# Patient Record
Sex: FEMALE | Race: WHITE | NOT HISPANIC OR LATINO | Employment: FULL TIME | URBAN - NONMETROPOLITAN AREA
[De-identification: names, ages, dates, MRNs, and addresses within clinical notes are randomized per-mention and may not be internally consistent; named-entity substitution may affect disease eponyms.]

---

## 2022-12-22 ENCOUNTER — OFFICE VISIT (OUTPATIENT)
Dept: CARDIOLOGY | Facility: CLINIC | Age: 57
End: 2022-12-22

## 2022-12-22 VITALS
HEIGHT: 66 IN | OXYGEN SATURATION: 96 % | WEIGHT: 204 LBS | HEART RATE: 67 BPM | BODY MASS INDEX: 32.78 KG/M2 | RESPIRATION RATE: 18 BRPM | SYSTOLIC BLOOD PRESSURE: 130 MMHG | DIASTOLIC BLOOD PRESSURE: 78 MMHG | TEMPERATURE: 97.3 F

## 2022-12-22 DIAGNOSIS — I35.8 HEART MURMUR, AORTIC: Primary | ICD-10-CM

## 2022-12-22 DIAGNOSIS — E78.5 HYPERLIPIDEMIA LDL GOAL <70: ICD-10-CM

## 2022-12-22 DIAGNOSIS — I10 ESSENTIAL HYPERTENSION: ICD-10-CM

## 2022-12-22 PROBLEM — E11.9 TYPE 2 DIABETES MELLITUS WITHOUT COMPLICATION, WITHOUT LONG-TERM CURRENT USE OF INSULIN: Status: ACTIVE | Noted: 2022-12-22

## 2022-12-22 PROCEDURE — 93000 ELECTROCARDIOGRAM COMPLETE: CPT | Performed by: INTERNAL MEDICINE

## 2022-12-22 PROCEDURE — 99204 OFFICE O/P NEW MOD 45 MIN: CPT | Performed by: INTERNAL MEDICINE

## 2022-12-22 RX ORDER — AMINO ACIDS/CHROMIUM
1 TABLET ORAL DAILY
COMMUNITY

## 2022-12-22 RX ORDER — ALPRAZOLAM 0.5 MG/1
0.5 TABLET ORAL 2 TIMES DAILY PRN
COMMUNITY

## 2022-12-22 RX ORDER — PAROXETINE 30 MG/1
30 TABLET, FILM COATED ORAL EVERY MORNING
COMMUNITY

## 2022-12-22 RX ORDER — METFORMIN HYDROCHLORIDE 500 MG/1
500 TABLET, EXTENDED RELEASE ORAL
COMMUNITY
End: 2023-01-19 | Stop reason: ALTCHOICE

## 2022-12-22 RX ORDER — ATENOLOL 50 MG/1
50 TABLET ORAL DAILY
COMMUNITY

## 2022-12-22 RX ORDER — VALSARTAN 160 MG/1
160 TABLET ORAL DAILY
Qty: 30 TABLET | Refills: 11 | Status: SHIPPED | OUTPATIENT
Start: 2022-12-22

## 2022-12-22 RX ORDER — FENOFIBRATE 145 MG/1
145 TABLET, COATED ORAL DAILY
COMMUNITY

## 2022-12-22 RX ORDER — ROSUVASTATIN CALCIUM 20 MG/1
20 TABLET, COATED ORAL DAILY
Qty: 90 TABLET | Refills: 3 | Status: SHIPPED | OUTPATIENT
Start: 2022-12-22

## 2022-12-22 RX ORDER — HYDROCHLOROTHIAZIDE 12.5 MG/1
12.5 CAPSULE, GELATIN COATED ORAL DAILY
COMMUNITY
End: 2022-12-22 | Stop reason: DRUGHIGH

## 2022-12-22 NOTE — PROGRESS NOTES
[facility] [place]  [facility] CARDIOLOGY  [address] [place] [address]  Dept: [phone]  Dept Fax: [phone]    Mandy Owens  1965    New Patient Office Note    History of Present Illness:  Mandy Owens is a 57 y.o. female who presents to the clinic for Establish Care. For heart murmur- She is 57 years old with diabetes for 1 year, hypertension, hyperlipidemia on Tricor, has seen a cardiologist for heart murmur and possible bicuspid valve, who left the practice and she is here to establish care, she denies any complaints, chest pain, SOB, edema, she takes Atenolol 50 mg and HCTZ 12,5 plus tricor, , her cardiac exam is consistent with a CARO on the aortic valve area, , BP is 140.80, no family history for congenital heart disease,, will get an echo, will also d.c HCTZ and use Valsartan 160 mg,  And will add Crestor 20 mg to Tricor,  , will see her back in 1 month with an echo    The following portions of the patient's history were reviewed and updated as appropriate: allergies, current medications, past family history, past medical history, past social history, past surgical history and problem list.    Medications:  ALPRAZolam  atenolol  Chromium tablet  empagliflozin tablet  fenofibrate  metFORMIN ER  PARoxetine  rosuvastatin  valsartan    Subjective  No Known Allergies     History reviewed. No pertinent past medical history.    History reviewed. No pertinent surgical history.    History reviewed. No pertinent family history.     Social History     Socioeconomic History   • Marital status:        Review of Systems   Constitutional: Negative.    HENT: Negative.    Respiratory: Negative.    Cardiovascular: Negative.    Endocrine: Negative.    Genitourinary: Negative.    Musculoskeletal: Negative.    Skin: Negative.    Allergic/Immunologic: Negative.    Neurological: Negative.    Hematological: Negative.    Psychiatric/Behavioral: Negative.        Cardiovascular  "Procedures    ECHO/MUGA:   STRESS TESTS:   CARDIAC CATH:   DEVICES:   HOLTER:   CT/MRI:   VASCULAR:   CARDIOTHORACIC:     Objective  Vitals:    12/22/22 1255   BP: 130/78   BP Location: Right arm   Patient Position: Lying   Cuff Size: Large Adult   Pulse: 67   Resp: 18   Temp: 97.3 °F (36.3 °C)   TempSrc: Infrared   SpO2: 96%   Weight: 92.5 kg (204 lb)   Height: 167.6 cm (66\")   PainSc: 0-No pain       Physical Exam  Vitals reviewed.   Constitutional:       Appearance: Healthy appearance. Not in distress.   Neck:      Vascular: No JVR. JVD normal.   Pulmonary:      Effort: Pulmonary effort is normal.      Breath sounds: Normal breath sounds. No wheezing. No rhonchi. No rales.   Chest:      Chest wall: Not tender to palpatation.   Cardiovascular:      PMI at left midclavicular line. Normal rate. Regular rhythm. Normal S1. Normal S2.      Murmurs: There is a grade 3/6 harsh midsystolic murmur at the URSB, radiating to the neck.      No gallop. No click. No rub.   Pulses:     Intact distal pulses.   Edema:     Peripheral edema absent.   Abdominal:      General: Bowel sounds are normal.      Palpations: Abdomen is soft.      Tenderness: There is no abdominal tenderness.   Musculoskeletal: Normal range of motion.         General: No tenderness. Skin:     General: Skin is warm and dry.   Neurological:      General: No focal deficit present.      Mental Status: Alert and oriented to person, place and time.          Diagnostic Data    ECG 12 Lead    Date/Time: 12/22/2022 2:14 PM  Performed by: Alan Henry MD  Authorized by: Alan Henry MD   Comparison: not compared with previous ECG   Previous ECG: no previous ECG available  Rhythm: sinus rhythm  Rate: normal  BPM: 70  Conduction: conduction normal    Clinical impression: normal ECG            Assessment and Plan  Diagnoses and all orders for this visit:    Heart murmur, aortic- Seems more for AS, as she has history of bicuspid valve will get an " echo    -     Adult Transthoracic Echo Complete W/ Cont if Necessary Per Protocol; Future    Essential hypertension- Will use Valsartan 160 mg instead HCTZ 12,5, keep Atenolol 50 mg   -     Adult Transthoracic Echo Complete W/ Cont if Necessary Per Protocol; Future    Hyperlipidemia LDL goal <70- Will start Crestor 20 mg, keep tricor for now  -     Adult Transthoracic Echo Complete W/ Cont if Necessary Per Protocol; Future    Other orders  -     Discontinue: hydroCHLOROthiazide (MICROZIDE) 12.5 MG capsule; Take 12.5 mg by mouth Daily.  -     fenofibrate (TRICOR) 145 MG tablet; Take 145 mg by mouth Daily.  -     atenolol (TENORMIN) 50 MG tablet; Take 50 mg by mouth Daily.  -     PARoxetine (PAXIL) 30 MG tablet; Take 30 mg by mouth Every Morning.  -     ALPRAZolam (XANAX) 0.5 MG tablet; Take 0.5 mg by mouth 2 (Two) Times a Day As Needed for Anxiety.  -     empagliflozin (Jardiance) 25 MG tablet tablet; Take 25 mg by mouth Daily.  -     metFORMIN ER (GLUCOPHAGE-XR) 500 MG 24 hr tablet; Take 500 mg by mouth Daily With Breakfast.  -     Chromium 1000 MCG tablet; Take 1 mg by mouth Daily.  -     valsartan (DIOVAN) 160 MG tablet; Take 1 tablet by mouth Daily.  -     rosuvastatin (CRESTOR) 20 MG tablet; Take 1 tablet by mouth Daily.        Return in about 4 weeks (around 1/19/2023) for Recheck.    Alan Henry MD  12/22/2022

## 2022-12-27 ENCOUNTER — TELEPHONE (OUTPATIENT)
Dept: CARDIOLOGY | Facility: CLINIC | Age: 57
End: 2022-12-27

## 2023-01-19 ENCOUNTER — OFFICE VISIT (OUTPATIENT)
Dept: CARDIOLOGY | Facility: CLINIC | Age: 58
End: 2023-01-19
Payer: COMMERCIAL

## 2023-01-19 VITALS
TEMPERATURE: 98 F | HEIGHT: 66 IN | WEIGHT: 203 LBS | RESPIRATION RATE: 12 BRPM | OXYGEN SATURATION: 99 % | DIASTOLIC BLOOD PRESSURE: 82 MMHG | BODY MASS INDEX: 32.62 KG/M2 | HEART RATE: 66 BPM | SYSTOLIC BLOOD PRESSURE: 136 MMHG

## 2023-01-19 DIAGNOSIS — E11.9 TYPE 2 DIABETES MELLITUS WITHOUT COMPLICATION, WITHOUT LONG-TERM CURRENT USE OF INSULIN: ICD-10-CM

## 2023-01-19 DIAGNOSIS — E78.5 HYPERLIPIDEMIA LDL GOAL <70: ICD-10-CM

## 2023-01-19 DIAGNOSIS — I10 ESSENTIAL HYPERTENSION: ICD-10-CM

## 2023-01-19 DIAGNOSIS — I35.0 AORTIC STENOSIS, MILD: Primary | ICD-10-CM

## 2023-01-19 PROBLEM — I35.8 HEART MURMUR, AORTIC: Status: RESOLVED | Noted: 2022-12-22 | Resolved: 2023-01-19

## 2023-01-19 PROCEDURE — 99214 OFFICE O/P EST MOD 30 MIN: CPT | Performed by: INTERNAL MEDICINE

## 2023-01-19 NOTE — PROGRESS NOTES
[facility] [place]  [facility] CARDIOLOGY  [address] [place] [address]  Dept: [phone]  Dept Fax: [phone]    Mandy Owens  1965    Follow Up Office Visit Note    History of Present Illness:  Mandy Owens is a 57 y.o. female who presents to the clinic for Follow-up and Heart Murmur. She underwent echo and this showed possible bicuspid valve with mild AS TARYN 1,4 cm, Mean gradient 13 and aortic velocity 2.,4 cm, she was advised her children and sibling needs to get check, the valve could not be seen well but because she has aortic stenosis at young age likely is bicuspid, will see her annually, she was explained possible complaints when the valve gets tighter and severe CP, SOB, syncope,     The following portions of the patient's history were reviewed and updated as appropriate: allergies, current medications, past family history, past medical history, past social history, past surgical history, and problem list.    Medications:  ALPRAZolam  atenolol  Chromium tablet  empagliflozin tablet  fenofibrate  PARoxetine  rosuvastatin  valsartan    Subjective  No Known Allergies     History reviewed. No pertinent past medical history.    History reviewed. No pertinent surgical history.    History reviewed. No pertinent family history.     Social History     Socioeconomic History   • Marital status:        Review of Systems   Constitutional: Negative.    HENT: Negative.    Respiratory: Negative.    Cardiovascular: Negative.    Endocrine: Negative.    Genitourinary: Negative.    Musculoskeletal: Negative.    Skin: Negative.    Allergic/Immunologic: Negative.    Neurological: Negative.    Hematological: Negative.    Psychiatric/Behavioral: Negative.        Cardiovascular Procedures    ECHO/MUGA:  STRESS TESTS:   CARDIAC CATH:   DEVICES:   HOLTER:   CT/MRI:   VASCULAR:   CARDIOTHORACIC:     Objective  Vitals:    01/19/23 0938   BP: 136/82   BP Location: Right arm   Patient  "Position: Lying   Cuff Size: Adult   Pulse: 66   Resp: 12   Temp: 98 °F (36.7 °C)   TempSrc: Infrared   SpO2: 99%   Weight: 92.1 kg (203 lb)   Height: 167.6 cm (66\")   PainSc: 0-No pain     Body mass index is 32.77 kg/m².     Physical Exam  Constitutional:       Appearance: Healthy appearance. Not in distress.   Neck:      Vascular: No JVR. JVD normal.   Pulmonary:      Effort: Pulmonary effort is normal.      Breath sounds: Normal breath sounds. No wheezing. No rhonchi. No rales.   Chest:      Chest wall: Not tender to palpatation.   Cardiovascular:      PMI at left midclavicular line. Normal rate. Regular rhythm. Normal S1. Normal S2.      Murmurs: There is a grade 3/6 harsh midsystolic murmur at the URSB, radiating to the neck.      No gallop. No click. No rub.   Pulses:     Intact distal pulses.   Edema:     Peripheral edema absent.   Abdominal:      General: Bowel sounds are normal.      Palpations: Abdomen is soft.      Tenderness: There is no abdominal tenderness.   Musculoskeletal: Normal range of motion.         General: No tenderness. Skin:     General: Skin is warm and dry.   Neurological:      General: No focal deficit present.      Mental Status: Alert and oriented to person, place and time.          Diagnostic Data  Procedures    Assessment and Plan  Diagnoses and all orders for this visit:    Aortic stenosis, mild- TARYN 1,4 cm, aortic velocity 2,4 and Mean gradient 13.    Essential hypertension- BP is 130.80 on Valsartan 160 mg and also Atenolol 50 mg, will keep both    Hyperlipidemia LDL goal <70- We start her on Crestor 20 mg, she is also on Fenofibrate, will need a Lipid in 3-4 months we might juanac the Fenofibrate depends of the lab    Type 2 diabetes mellitus without complication, without long-term current use of insulin (HCC)         Return in about 1 year (around 1/19/2024) for Recheck with Dr. Henry.    Alan Henry MD  01/19/2023  "

## 2024-01-19 RX ORDER — VALSARTAN 160 MG/1
160 TABLET ORAL DAILY
Qty: 30 TABLET | Refills: 0 | Status: SHIPPED | OUTPATIENT
Start: 2024-01-19

## 2024-01-19 RX ORDER — ROSUVASTATIN CALCIUM 20 MG/1
20 TABLET, COATED ORAL DAILY
Qty: 30 TABLET | Refills: 0 | Status: SHIPPED | OUTPATIENT
Start: 2024-01-19

## 2024-03-07 ENCOUNTER — OFFICE VISIT (OUTPATIENT)
Dept: CARDIOLOGY | Facility: CLINIC | Age: 59
End: 2024-03-07
Payer: COMMERCIAL

## 2024-03-07 ENCOUNTER — TELEPHONE (OUTPATIENT)
Dept: CARDIOLOGY | Facility: CLINIC | Age: 59
End: 2024-03-07

## 2024-03-07 VITALS
HEART RATE: 66 BPM | HEIGHT: 66 IN | DIASTOLIC BLOOD PRESSURE: 72 MMHG | RESPIRATION RATE: 16 BRPM | BODY MASS INDEX: 33.11 KG/M2 | OXYGEN SATURATION: 97 % | WEIGHT: 206 LBS | SYSTOLIC BLOOD PRESSURE: 104 MMHG

## 2024-03-07 DIAGNOSIS — I35.0 AORTIC STENOSIS, MILD: ICD-10-CM

## 2024-03-07 DIAGNOSIS — E78.5 HYPERLIPIDEMIA LDL GOAL <70: ICD-10-CM

## 2024-03-07 DIAGNOSIS — I10 ESSENTIAL HYPERTENSION: Primary | ICD-10-CM

## 2024-03-07 DIAGNOSIS — E11.9 TYPE 2 DIABETES MELLITUS WITHOUT COMPLICATION, WITHOUT LONG-TERM CURRENT USE OF INSULIN: ICD-10-CM

## 2024-03-07 RX ORDER — ROSUVASTATIN CALCIUM 20 MG/1
20 TABLET, COATED ORAL DAILY
Qty: 90 TABLET | Refills: 3 | Status: SHIPPED | OUTPATIENT
Start: 2024-03-07

## 2024-03-07 RX ORDER — VALSARTAN 320 MG/1
320 TABLET ORAL DAILY
Qty: 90 TABLET | Refills: 3 | Status: SHIPPED | OUTPATIENT
Start: 2024-03-07 | End: 2024-03-07

## 2024-03-07 RX ORDER — HYDROCHLOROTHIAZIDE 12.5 MG/1
12.5 TABLET ORAL DAILY
COMMUNITY
Start: 2024-02-02 | End: 2024-03-07 | Stop reason: ALTCHOICE

## 2024-03-07 RX ORDER — ROSUVASTATIN CALCIUM 20 MG/1
20 TABLET, COATED ORAL DAILY
Qty: 90 TABLET | Refills: 3 | Status: SHIPPED | OUTPATIENT
Start: 2024-03-07 | End: 2024-03-07

## 2024-03-07 RX ORDER — VALSARTAN 320 MG/1
320 TABLET ORAL DAILY
Qty: 90 TABLET | Refills: 3 | Status: SHIPPED | OUTPATIENT
Start: 2024-03-07

## 2024-03-07 RX ORDER — ROSUVASTATIN CALCIUM 20 MG/1
20 TABLET, COATED ORAL DAILY
Qty: 30 TABLET | Refills: 0 | Status: SHIPPED | OUTPATIENT
Start: 2024-03-07 | End: 2024-03-07 | Stop reason: SDUPTHER

## 2024-03-07 RX ORDER — VALSARTAN 320 MG/1
320 TABLET ORAL DAILY
Qty: 30 TABLET | Refills: 1 | Status: SHIPPED | OUTPATIENT
Start: 2024-03-07 | End: 2024-03-07 | Stop reason: SDUPTHER

## 2024-03-08 NOTE — PROGRESS NOTES
[facility] [place]  [facility] CARDIOLOGY  [address] [place] [address]  Dept: [phone]  Dept Fax: [phone]    Mandy Owens  1965    Follow Up Office Visit Note    History of Present Illness:  Mandy Owens is a 58 y.o. female who presents to the clinic for Follow-up.Bicuspid Aortic valve with Mild progressive aortic stenosis 1,3 cm    denies any complaints, no CP, no SOB, no syncope    The following portions of the patient's history were reviewed and updated as appropriate: allergies, current medications, past family history, past medical history, past social history, past surgical history, and problem list.    Medications:  ALPRAZolam  atenolol  Chromium tablet  empagliflozin tablet  PARoxetine  rosuvastatin  valsartan    Subjective  No Known Allergies     History reviewed. No pertinent past medical history.    History reviewed. No pertinent surgical history.    History reviewed. No pertinent family history.     Social History     Socioeconomic History    Marital status:    Tobacco Use    Smoking status: Former     Current packs/day: 0.00     Types: Cigarettes     Quit date:      Years since quittin.1    Smokeless tobacco: Never   Vaping Use    Vaping status: Every Day    Substances: Nicotine   Substance and Sexual Activity    Alcohol use: Not Currently    Drug use: Never    Sexual activity: Defer       Review of Systems   Constitutional: Negative.    HENT: Negative.     Respiratory: Negative.     Cardiovascular: Negative.    Endocrine: Negative.    Genitourinary: Negative.    Musculoskeletal: Negative.    Skin: Negative.    Allergic/Immunologic: Negative.    Neurological: Negative.    Hematological: Negative.    Psychiatric/Behavioral: Negative.       Cardiovascular Procedures    ECHO/MUGA:  STRESS TESTS:   CARDIAC CATH:   DEVICES:   HOLTER:   CT/MRI:   VASCULAR:   CARDIOTHORACIC:     Objective  Vitals:    24 0920   BP: 104/72   BP Location: Right  "arm   Patient Position: Lying   Cuff Size: Large Adult   Pulse: 66   Resp: 16   SpO2: 97%   Weight: 93.4 kg (206 lb)   Height: 167.6 cm (66\")   PainSc: 0-No pain     Body mass index is 33.25 kg/m².     Physical Exam  Vitals reviewed.   Constitutional:       Appearance: Healthy appearance. Not in distress.   Neck:      Vascular: No JVR. JVD normal.   Pulmonary:      Effort: Pulmonary effort is normal.      Breath sounds: Normal breath sounds. No wheezing. No rhonchi. No rales.   Chest:      Chest wall: Not tender to palpatation.   Cardiovascular:      PMI at left midclavicular line. Normal rate. Regular rhythm. Normal S1. Normal S2.       Murmurs: There is a harsh midsystolic murmur at the URSB, radiating to the neck.      No gallop.  No click. No rub.   Pulses:     Intact distal pulses.   Edema:     Peripheral edema absent.   Abdominal:      General: Bowel sounds are normal.      Palpations: Abdomen is soft.      Tenderness: There is no abdominal tenderness.   Musculoskeletal: Normal range of motion.         General: No tenderness. Skin:     General: Skin is warm and dry.   Neurological:      General: No focal deficit present.      Mental Status: Alert and oriented to person, place and time.        Diagnostic Data    ECG 12 Lead    Date/Time: 3/7/2024 8:13 PM  Performed by: Alan Henry MD    Authorized by: Alan Henry MD  Comparison: compared with previous ECG from 12/22/2022  Similar to previous ECG  Rhythm: sinus rhythm  Rate: normal  BPM: 61  QRS axis: normal    Clinical impression: normal ECG        Assessment and Plan  Diagnoses and all orders for this visit:    Essential hypertension-BP is 110.80 on Valsartan 160 mg, will increase to 320 mg, will d/c HCTZ and keep Atenolol 50 mg, we might lower the dose later    Aortic stenosis, mild- 1,3 cm 2023, no complaints, Aortic velocity 24 , will get an echo next year  -     Adult Transthoracic Echo Complete W/ Cont if Necessary Per Protocol; " Future    Hyperlipidemia LDL goal <70- On crestor 20 mg, Last LDL 40, tolerates well    Type 2 diabetes mellitus without complication, without long-term current use of insulin    Other orders  -     Discontinue: hydroCHLOROthiazide 12.5 MG tablet; Take 1 tablet by mouth Daily.  -     Discontinue: valsartan (DIOVAN) 320 MG tablet; Take 1 tablet by mouth Daily.  -     Discontinue: valsartan (DIOVAN) 320 MG tablet; Take 1 tablet by mouth Daily.  -     Discontinue: rosuvastatin (CRESTOR) 20 MG tablet; Take 1 tablet by mouth Daily.  -     Discontinue: rosuvastatin (CRESTOR) 20 MG tablet; Take 1 tablet by mouth Daily.         Return in about 1 year (around 3/7/2025) for Recheck with Dr. Henry.    Alan Henry MD  03/07/2024

## 2025-03-03 ENCOUNTER — TELEPHONE (OUTPATIENT)
Dept: CARDIOLOGY | Facility: CLINIC | Age: 60
End: 2025-03-03
Payer: COMMERCIAL

## 2025-03-03 NOTE — TELEPHONE ENCOUNTER
Left message for patient to call back and schedule annual ECHO, ordered by Dr Henry, before her appointment with Dr. Henry 3/14/25. HUB to transfer call to the office.

## 2025-03-04 NOTE — TELEPHONE ENCOUNTER
Caller: Mandy Owens    Relationship to patient: Self    Best call back number: [phone]    Patient is needing: PATIENT WT TO OFFICE

## 2025-03-18 ENCOUNTER — TELEPHONE (OUTPATIENT)
Dept: CARDIOLOGY | Facility: CLINIC | Age: 60
End: 2025-03-18
Payer: COMMERCIAL

## 2025-03-18 RX ORDER — VALSARTAN 320 MG/1
320 TABLET ORAL DAILY
Qty: 30 TABLET | Refills: 0 | Status: SHIPPED | OUTPATIENT
Start: 2025-03-18 | End: 2025-03-19 | Stop reason: SDUPTHER

## 2025-03-18 RX ORDER — ROSUVASTATIN CALCIUM 20 MG/1
20 TABLET, COATED ORAL DAILY
Qty: 30 TABLET | Refills: 0 | Status: SHIPPED | OUTPATIENT
Start: 2025-03-18 | End: 2025-03-19 | Stop reason: SDUPTHER

## 2025-03-18 NOTE — TELEPHONE ENCOUNTER
Pt needs apt has been over 1 year . Will send in 30 days only . Called and left pt a message and hub can make her an appt

## 2025-03-18 NOTE — TELEPHONE ENCOUNTER
Pt has new pharmacy benefits and [facility]. Currently set up for an Echo 4/3.

## 2025-03-19 RX ORDER — ROSUVASTATIN CALCIUM 20 MG/1
20 TABLET, COATED ORAL DAILY
Qty: 30 TABLET | Refills: 0 | Status: SHIPPED | OUTPATIENT
Start: 2025-03-19

## 2025-03-19 RX ORDER — VALSARTAN 320 MG/1
320 TABLET ORAL DAILY
Qty: 30 TABLET | Refills: 0 | Status: SHIPPED | OUTPATIENT
Start: 2025-03-19 | End: 2025-03-19 | Stop reason: SDUPTHER

## 2025-03-19 RX ORDER — ROSUVASTATIN CALCIUM 20 MG/1
20 TABLET, COATED ORAL DAILY
Qty: 30 TABLET | Refills: 0 | Status: SHIPPED | OUTPATIENT
Start: 2025-03-19 | End: 2025-03-19 | Stop reason: SDUPTHER

## 2025-03-19 RX ORDER — VALSARTAN 320 MG/1
320 TABLET ORAL DAILY
Qty: 30 TABLET | Refills: 0 | Status: SHIPPED | OUTPATIENT
Start: 2025-03-19

## 2025-03-19 NOTE — TELEPHONE ENCOUNTER
The pt is going to be scheduled for an appointment. It had to be postponed due to tech being out sick last week and she needs the Echo done before she sees Dr Henry.    Also, the medications were sent to the wrong pharmacy. Per my previous message, her meds need to be sent to [facility]; not [facility].

## 2025-04-30 ENCOUNTER — TELEPHONE (OUTPATIENT)
Dept: CARDIOLOGY | Facility: CLINIC | Age: 60
End: 2025-04-30

## 2025-04-30 ENCOUNTER — TELEPHONE (OUTPATIENT)
Dept: CARDIOLOGY | Facility: CLINIC | Age: 60
End: 2025-04-30
Payer: COMMERCIAL

## 2025-04-30 NOTE — TELEPHONE ENCOUNTER
Caller: Mandy Owens    Relationship: Self    Best call back number: [phone]     Who is your current provider: MD SUZIE     Is your current provider offboarding? NO     Who would you like your new provider to be: MD MICHAEL     What are your reasons for transferring care: MOTHER AND AUNT SEES DR. RODRIGUEZ, KNOWS THEIR FAMILY HX.

## 2025-04-30 NOTE — TELEPHONE ENCOUNTER
Caller: Mandy Owens    Relationship: Self    Best call back number: [phone]     Who is your current provider: MD SUZIE     Is your current provider offboarding? NO     Who would you like your new provider to be: MD MICHAEL     What are your reasons for transferring care: MOTHER AND AUNT SEES DR. RODRIGUEZ, KNOWS THEIR FAMILY HX.

## 2025-05-19 RX ORDER — ROSUVASTATIN CALCIUM 20 MG/1
20 TABLET, COATED ORAL DAILY
Qty: 30 TABLET | Refills: 0 | OUTPATIENT
Start: 2025-05-19

## 2025-05-28 ENCOUNTER — OFFICE VISIT (OUTPATIENT)
Dept: CARDIOLOGY | Facility: CLINIC | Age: 60
End: 2025-05-28
Payer: COMMERCIAL

## 2025-05-28 VITALS
HEART RATE: 68 BPM | WEIGHT: 207 LBS | DIASTOLIC BLOOD PRESSURE: 78 MMHG | OXYGEN SATURATION: 94 % | HEIGHT: 66 IN | BODY MASS INDEX: 33.27 KG/M2 | SYSTOLIC BLOOD PRESSURE: 128 MMHG

## 2025-05-28 DIAGNOSIS — I10 ESSENTIAL HYPERTENSION: ICD-10-CM

## 2025-05-28 DIAGNOSIS — I35.0 AORTIC STENOSIS, MILD: Primary | ICD-10-CM

## 2025-05-28 DIAGNOSIS — E78.5 HYPERLIPIDEMIA LDL GOAL <70: ICD-10-CM

## 2025-05-28 PROCEDURE — 99214 OFFICE O/P EST MOD 30 MIN: CPT | Performed by: INTERNAL MEDICINE

## 2025-05-28 RX ORDER — FENOFIBRATE 145 MG/1
145 TABLET, FILM COATED ORAL DAILY
Start: 2025-05-28

## 2025-05-28 NOTE — PROGRESS NOTES
OFFICE VISIT  NOTE  [facility] CARDIOLOGY      Name: Mandy Owens    Date: 2025  MRN:  [id number]  :  1965      REFERRING/PRIMARY PROVIDER:  Rosi Hopson APRN    Chief Complaint   Patient presents with    Atypical aortic valve stenosis       HPI: Mandy Owens is a 59 y.o. female who presents for bicuspid aortic valve with mild aortic stenosis.  Her brother is also a patient of mine Jayesh Ghanshyam. history of primary hypertension, and hypertriglyceridemia along with obesity BMI 33.  She was doing poorly with low blood pressure feeling she has got a pass out and very fatigued on valsartan 320 mg daily she stopped valsartan around 2025 and is doing quite well since that time, home blood pressure running around 120/70 with reduced fatigue and passing out.  She also felt her cholesterol was too low, lipids from 3/14/2025 at PCP showed total cholesterol 91 triglycerides 71 HDL 38 LDL 38.  She stopped rosuvastatin 2025 and resume fenofibrate 145 mg daily.  Denies chest pain or shortness of breath.  Echo 2025 showed mild aortic stenosis mean gradient 18 mmHg with normal EF and bicuspid aortic valve.    History reviewed. No pertinent past medical history.    History reviewed. No pertinent surgical history.    Social History     Socioeconomic History    Marital status:    Tobacco Use    Smoking status: Former     Current packs/day: 0.00     Types: Cigarettes     Quit date:      Years since quittin.4    Smokeless tobacco: Never   Vaping Use    Vaping status: Every Day    Substances: Nicotine   Substance and Sexual Activity    Alcohol use: Not Currently    Drug use: Never    Sexual activity: Defer       History reviewed. No pertinent family history.     ROS:   Constitutional no fever,  no weight loss   Skin no rash, no subcutaneous nodules   Otolaryngeal no difficulty swallowing   Cardiovascular See HPI   Pulmonary no cough, no sputum production   Gastrointestinal no  "constipation, no diarrhea   Genitourinary no dysuria, no hematuria   Hematologic no easy bruisability, no abnormal bleeding   Musculoskeletal no muscle pain   Neurologic no dizziness, no falls         No Known Allergies      Current Outpatient Medications:     ALPRAZolam (XANAX) 0.5 MG tablet, Take 1 tablet by mouth 2 (Two) Times a Day As Needed for Anxiety., Disp: , Rfl:     atenolol (TENORMIN) 50 MG tablet, Take 1 tablet by mouth Daily., Disp: , Rfl:     Chromium 1000 MCG tablet, Take 1 mg by mouth Every Other Day., Disp: , Rfl:     empagliflozin (JARDIANCE) 25 MG tablet tablet, Take 1 tablet by mouth Daily., Disp: , Rfl:     PARoxetine (PAXIL) 30 MG tablet, Take 1 tablet by mouth Every Morning., Disp: , Rfl:     fenofibrate (TRICOR) 145 MG tablet, Take 1 tablet by mouth Daily., Disp: , Rfl:     Vitals:    05/28/25 0912   BP: 128/78   Pulse: 68   SpO2: 94%   Weight: 93.9 kg (207 lb)   Height: 167.6 cm (66\")     Body mass index is 33.41 kg/m².    PHYSICAL EXAM:    General Appearance:   well developed  well nourished  HENT:   oropharynx moist  lips not cyanotic  Neck:  thyroid not enlarged  supple  Respiratory:  no respiratory distress  normal breath sounds  no rales  Cardiovascular:  no jugular venous distention  regular rhythm  apical impulse normal  S1 normal, S2 normal  no S3, no S4   Very mild 1/6 systolic murmur at the base  no rub, no thrill  carotid pulses normal; no bruit  lower extremity edema: none      Musculoskeletal:  no clubbing of fingers.   normocephalic, head atraumatic  Skin:   warm, dry  Psychiatric:  judgement and insight appropriate  normal mood and affect    RESULTS:   Procedures    Results for orders placed in visit on 04/22/25    Adult Transthoracic Echo Complete W/ Cont if Necessary Per Protocol    Interpretation Summary    Left ventricular systolic function is normal. Calculated left ventricular EF = 52% Left ventricular ejection fraction appears to be 61 - 65%.    Left ventricular " "diastolic function is consistent with (grade I) impaired relaxation.    Mild aortic valve stenosis is present. Aortic valve area is 1.4 cm2.The aortic valve is not well seen , can not exclude bicuspid valve    Peak velocity of the flow distal to the aortic valve is 230 cm/s. Aortic valve maximum pressure gradient is 22 mmHg. Aortic valve mean pressure gradient is 12 mmHg.    The mitral valve peak gradient is 6 mmHg. The mitral valve mean gradient is 2 mmHg. The mitral valve area (PHT) is 2.2 cm2.    Estimated right ventricular systolic pressure from tricuspid regurgitation is mildly elevated (35-45 mmHg). Calculated right ventricular systolic pressure from tricuspid regurgitation is 44 mmHg. mild TR    The aortic root measures 2.8 cm.    No pericardial effusion        Labs:  No results found for: \"CHOL\", \"TRIG\", \"HDL\", \"LDL\", \"AST\", \"ALT\"  Lab Results   Component Value Date    HGBA1C 6.8 (H) 09/23/2024     No components found for: \"CREATINININE\"  No results found for: \"EGFRIFNONA\"    Most recent PCP note, imaging tests, and labs reviewed.    ASSESSMENT:  Problem List Items Addressed This Visit       Essential hypertension    Hyperlipidemia LDL goal <70    Relevant Medications    fenofibrate (TRICOR) 145 MG tablet    Aortic stenosis, mild - Primary       PLAN:    1.  Bicuspid aortic valve with mild aortic stenosis:  Echo from 4/2025 reviewed, mild aortic stenosis with mean gradient of millimeters of mercury, improved findings compared to 1/2023 echo  Repeat echo in 2 years around 4/2027    2.  Hypertension:  Blood pressure was too low on valsartan 320 mg daily, she is off it which I agree with  Continue atenolol  Continue low-sodium diet and exercise  Goal blood pressure less than 130/80    3.  Mixed hyperlipidemia:  LDL was 38 on rosuvastatin 20 mg daily agree with stopping statin therapy  Continue fenofibrate for goal triglycerides less than 200        Return to clinic in 12 months, or sooner as needed.    Thank " you for the opportunity to share in the care of your patient; please do not hesitate to call me with any questions.     David Mcgovern MD, [facility], [facility]  Office: [phone] [address] [place] [address]    05/28/25

## 2025-06-11 ENCOUNTER — TELEPHONE (OUTPATIENT)
Dept: CARDIOLOGY | Facility: CLINIC | Age: 60
End: 2025-06-11
Payer: COMMERCIAL

## 2025-06-11 NOTE — TELEPHONE ENCOUNTER
Caller: Owens Mandy    Relationship: Self    Best call back number: [phone]    What is the best time to reach you: ANY    Who are you requesting to speak with (clinical staff, provider,  specific staff member): CLINICAL    What was the call regarding: PATIENT CALLED BECAUSE SHE TOLD DR RODRIGUEZ THAT SHE WOULD HAVE HER PCP PRESCRIBE HER THE HCTZ 12.5 MG, BUT SHE CHANGED HER MIND AND WOULD LIKE DR RODRIGUEZ TO PRESCRIBE IT. PLEASE SEND IT TO [facility] # [id number] IN [place]. THANK YOU    Is it okay if the provider responds through [facility]: PLEASE CALL

## 2025-06-13 RX ORDER — HYDROCHLOROTHIAZIDE 12.5 MG/1
12.5 CAPSULE ORAL DAILY
Qty: 90 CAPSULE | Refills: 0 | Status: SHIPPED | OUTPATIENT
Start: 2025-06-13